# Patient Record
Sex: MALE | Race: WHITE | URBAN - METROPOLITAN AREA
[De-identification: names, ages, dates, MRNs, and addresses within clinical notes are randomized per-mention and may not be internally consistent; named-entity substitution may affect disease eponyms.]

---

## 2020-06-11 ENCOUNTER — EMERGENCY (EMERGENCY)
Facility: HOSPITAL | Age: 30
LOS: 1 days | Discharge: HOME | End: 2020-06-11
Admitting: EMERGENCY MEDICINE
Payer: OTHER MISCELLANEOUS

## 2020-06-11 VITALS
WEIGHT: 229.94 LBS | TEMPERATURE: 99 F | SYSTOLIC BLOOD PRESSURE: 136 MMHG | OXYGEN SATURATION: 97 % | HEIGHT: 75 IN | RESPIRATION RATE: 18 BRPM | DIASTOLIC BLOOD PRESSURE: 79 MMHG | HEART RATE: 82 BPM
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
## 2020-06-11 DIAGNOSIS — S93.401A SPRAIN OF UNSPECIFIED LIGAMENT OF RIGHT ANKLE, INITIAL ENCOUNTER: ICD-10-CM

## 2020-06-11 DIAGNOSIS — M25.571 PAIN IN RIGHT ANKLE AND JOINTS OF RIGHT FOOT: ICD-10-CM
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
## 2020-06-11 PROCEDURE — 73630 X-RAY EXAM OF FOOT: CPT | Mod: 26,RT

## 2020-06-11 PROCEDURE — 73610 X-RAY EXAM OF ANKLE: CPT | Mod: 26,RT

## 2020-06-11 PROCEDURE — 99053 MED SERV 10PM-8AM 24 HR FAC: CPT

## 2020-06-11 PROCEDURE — 99283 EMERGENCY DEPT VISIT LOW MDM: CPT

## 2020-06-11 RX ORDER — IBUPROFEN 200 MG
800 TABLET ORAL ONCE
Refills: 0 | Status: COMPLETED | OUTPATIENT
Start: 2020-06-11 | End: 2020-06-11

## 2020-06-11 RX ADMIN — Medication 800 MILLIGRAM(S): at 23:47

## 2020-06-11 NOTE — ED ADULT TRIAGE NOTE - CHIEF COMPLAINT QUOTE
Pt ([employer]) walks in c/o R ankle pain after rolling it off a curb. Pt did not fall/hit head. Pt able to ambulate but experiences pain. Pt states R foot is more swollen than ankle.

## 2020-06-12 NOTE — ED PROVIDER NOTE - DIAGNOSTIC INTERPRETATION
xray ankle R 3v no fx, no dislocation, mild soft tissue swelling laterally    xray foot R 3v no fx, no dislocation, mild soft tissue swelling laterally

## 2020-06-12 NOTE — ED PROVIDER NOTE - OBJECTIVE STATEMENT
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 states that he rolled his ankle while he stepped off a curb. denies fall, direct trauma. incident occurred while patient was on duty. able to weight bear, denies focal weakness, paresthesias. noted swelling over foot

## 2020-06-12 NOTE — ED PROVIDER NOTE - CLINICAL SUMMARY MEDICAL DECISION MAKING FREE TEXT BOX
s/p twisting injury to R ankle while working. able to weight bear, xray WNL. given ibuprofen and ace wrap. advised rest, ice, elevation, NSAIDs

## 2020-06-12 NOTE — ED PROVIDER NOTE - PATIENT PORTAL LINK FT
You can access the FollowMyHealth Patient Portal offered by [facility] by registering at the following website: [email or web address]. By joining [employer]’s FollowMyHealth portal, you will also be able to view your health information using other applications (apps) compatible with our system.

## 2020-06-12 NOTE — ED PROVIDER NOTE - PHYSICAL EXAMINATION
RLE: edema over the lateral malleolus, TTP over lateral ankle and base of 5th MTP, skin intact  cap refill <2 sec, DP pulses equal.